# Patient Record
Sex: FEMALE | Race: WHITE | NOT HISPANIC OR LATINO | URBAN - METROPOLITAN AREA
[De-identification: names, ages, dates, MRNs, and addresses within clinical notes are randomized per-mention and may not be internally consistent; named-entity substitution may affect disease eponyms.]

---

## 2023-12-05 ENCOUNTER — APPOINTMENT (RX ONLY)
Dept: URBAN - METROPOLITAN AREA CLINIC 342 | Facility: CLINIC | Age: 23
Setting detail: DERMATOLOGY
End: 2023-12-05

## 2023-12-05 DIAGNOSIS — Z41.9 ENCOUNTER FOR PROCEDURE FOR PURPOSES OTHER THAN REMEDYING HEALTH STATE, UNSPECIFIED: ICD-10-CM

## 2023-12-05 PROCEDURE — ? MEDICAL CONSULTATION HYDRAFACIAL

## 2023-12-05 PROCEDURE — ? COSMETIC CONSULTATION: CHEMICAL PEELS

## 2023-12-21 ENCOUNTER — APPOINTMENT (RX ONLY)
Dept: URBAN - METROPOLITAN AREA CLINIC 342 | Facility: CLINIC | Age: 23
Setting detail: DERMATOLOGY
End: 2023-12-21

## 2023-12-21 DIAGNOSIS — Z41.9 ENCOUNTER FOR PROCEDURE FOR PURPOSES OTHER THAN REMEDYING HEALTH STATE, UNSPECIFIED: ICD-10-CM

## 2023-12-21 PROCEDURE — ? HYDRAFACIAL

## 2023-12-21 NOTE — PROCEDURE: HYDRAFACIAL
Consent: Written consent obtained, risks reviewed including but not limited to crusting, scabbing, blistering, scarring, darker or lighter pigmentary change, bruising, and/or incomplete response.
Vacuum Pressure High Setting (Will Not Render If Set To 0): 0
Solution: GlySal 7.5%
Tip: Hydropeel Tip, Blue
Location Override: Face, neck and back
Price (Use Numbers Only, No Special Characters Or $): 089.16
Vacuum Pressure Low Setting (Will Not Render If Set To 0): 16
Post-Care Instructions: I reviewed with the patient in detail post-care instructions. Patient should avoid direct sun exposure and wear sunscreen daily.
No
Tip: Hydropeel Tip, Teal
Tip: Hydropeel Tip, Clear
Solution Override
Treatment Number: 1
Tip Override
Indication: anti-aging
Comments: Utilized murad Clarifying booster
Solution Override: Antiox +
Vacuum Pressure Low Setting (Will Not Render If Set To 0): 11
Location: face
Solution: Antiox-6
Procedure: Peel
Vacuum Pressure Low Setting (Will Not Render If Set To 0): 12
Solution: Activ-4
Solution Override: activ4 + betaHD